# Patient Record
Sex: MALE | Race: WHITE | Employment: OTHER | URBAN - METROPOLITAN AREA
[De-identification: names, ages, dates, MRNs, and addresses within clinical notes are randomized per-mention and may not be internally consistent; named-entity substitution may affect disease eponyms.]

---

## 2024-06-12 LAB — PSA, EXTERNAL: 1.8

## 2024-07-11 ENCOUNTER — OFFICE VISIT (OUTPATIENT)
Age: 74
End: 2024-07-11
Payer: MEDICARE

## 2024-07-11 VITALS
BODY MASS INDEX: 36.05 KG/M2 | DIASTOLIC BLOOD PRESSURE: 72 MMHG | HEART RATE: 63 BPM | TEMPERATURE: 98.1 F | SYSTOLIC BLOOD PRESSURE: 122 MMHG | WEIGHT: 272 LBS | HEIGHT: 73 IN | OXYGEN SATURATION: 95 %

## 2024-07-11 DIAGNOSIS — E11.65 TYPE 2 DIABETES MELLITUS WITH HYPERGLYCEMIA, WITHOUT LONG-TERM CURRENT USE OF INSULIN (HCC): Primary | ICD-10-CM

## 2024-07-11 DIAGNOSIS — E78.2 MIXED HYPERLIPIDEMIA: ICD-10-CM

## 2024-07-11 DIAGNOSIS — E11.65 TYPE 2 DIABETES MELLITUS WITH HYPERGLYCEMIA, WITHOUT LONG-TERM CURRENT USE OF INSULIN (HCC): ICD-10-CM

## 2024-07-11 LAB
ANION GAP SERPL CALC-SCNC: 5 MMOL/L (ref 5–15)
BUN SERPL-MCNC: 12 MG/DL (ref 6–20)
BUN/CREAT SERPL: 11 (ref 12–20)
CALCIUM SERPL-MCNC: 9.6 MG/DL (ref 8.5–10.1)
CHLORIDE SERPL-SCNC: 111 MMOL/L (ref 97–108)
CO2 SERPL-SCNC: 26 MMOL/L (ref 21–32)
CREAT SERPL-MCNC: 1.07 MG/DL (ref 0.7–1.3)
GLUCOSE SERPL-MCNC: 120 MG/DL (ref 65–100)
POTASSIUM SERPL-SCNC: 4.6 MMOL/L (ref 3.5–5.1)
SODIUM SERPL-SCNC: 142 MMOL/L (ref 136–145)

## 2024-07-11 PROCEDURE — G8417 CALC BMI ABV UP PARAM F/U: HCPCS | Performed by: INTERNAL MEDICINE

## 2024-07-11 PROCEDURE — 99204 OFFICE O/P NEW MOD 45 MIN: CPT | Performed by: INTERNAL MEDICINE

## 2024-07-11 PROCEDURE — G8427 DOCREV CUR MEDS BY ELIG CLIN: HCPCS | Performed by: INTERNAL MEDICINE

## 2024-07-11 PROCEDURE — 3017F COLORECTAL CA SCREEN DOC REV: CPT | Performed by: INTERNAL MEDICINE

## 2024-07-11 PROCEDURE — 2022F DILAT RTA XM EVC RTNOPTHY: CPT | Performed by: INTERNAL MEDICINE

## 2024-07-11 PROCEDURE — 4004F PT TOBACCO SCREEN RCVD TLK: CPT | Performed by: INTERNAL MEDICINE

## 2024-07-11 PROCEDURE — 1123F ACP DISCUSS/DSCN MKR DOCD: CPT | Performed by: INTERNAL MEDICINE

## 2024-07-11 PROCEDURE — 3046F HEMOGLOBIN A1C LEVEL >9.0%: CPT | Performed by: INTERNAL MEDICINE

## 2024-07-11 RX ORDER — LANCETS 30 GAUGE
EACH MISCELLANEOUS
Qty: 360 EACH | Refills: 3 | Status: SHIPPED | OUTPATIENT
Start: 2024-07-11 | End: 2024-07-11 | Stop reason: SDUPTHER

## 2024-07-11 RX ORDER — DAPAGLIFLOZIN 10 MG/1
10 TABLET, FILM COATED ORAL EVERY MORNING
Qty: 90 TABLET | Refills: 3 | Status: SHIPPED | OUTPATIENT
Start: 2024-07-11

## 2024-07-11 RX ORDER — BLOOD-GLUCOSE SENSOR
EACH MISCELLANEOUS
Qty: 6 EACH | Refills: 3 | Status: SHIPPED | OUTPATIENT
Start: 2024-07-11

## 2024-07-11 RX ORDER — SEMAGLUTIDE 0.68 MG/ML
0.5 INJECTION, SOLUTION SUBCUTANEOUS WEEKLY
Qty: 6 ML | Refills: 3 | Status: SHIPPED | OUTPATIENT
Start: 2024-07-11

## 2024-07-11 RX ORDER — SEMAGLUTIDE 0.68 MG/ML
0.25 INJECTION, SOLUTION SUBCUTANEOUS WEEKLY
COMMUNITY
End: 2024-07-11 | Stop reason: SDUPTHER

## 2024-07-11 RX ORDER — KETOROLAC TROMETHAMINE 30 MG/ML
INJECTION, SOLUTION INTRAMUSCULAR; INTRAVENOUS
Qty: 1 EACH | Refills: 0 | Status: SHIPPED | OUTPATIENT
Start: 2024-07-11 | End: 2024-07-11

## 2024-07-11 RX ORDER — TAMSULOSIN HYDROCHLORIDE 0.4 MG/1
0.4 CAPSULE ORAL DAILY
COMMUNITY
Start: 2023-06-12

## 2024-07-11 RX ORDER — DAPAGLIFLOZIN 10 MG/1
10 TABLET, FILM COATED ORAL EVERY MORNING
COMMUNITY
End: 2024-07-11 | Stop reason: SDUPTHER

## 2024-07-11 RX ORDER — GLUCOSAMINE HCL/CHONDROITIN SU 500-400 MG
CAPSULE ORAL
Qty: 100 STRIP | Refills: 3 | Status: SHIPPED | OUTPATIENT
Start: 2024-07-11 | End: 2024-07-11 | Stop reason: SDUPTHER

## 2024-07-11 RX ORDER — GLIPIZIDE 5 MG/1
2.5 TABLET ORAL
Qty: 45 TABLET | Refills: 1 | Status: SHIPPED | OUTPATIENT
Start: 2024-07-11

## 2024-07-11 RX ORDER — GLUCOSAMINE HCL/CHONDROITIN SU 500-400 MG
CAPSULE ORAL
Qty: 100 STRIP | Refills: 3 | Status: SHIPPED | OUTPATIENT
Start: 2024-07-11

## 2024-07-11 RX ORDER — ROSUVASTATIN CALCIUM 5 MG/1
10 TABLET, COATED ORAL DAILY
COMMUNITY

## 2024-07-11 RX ORDER — LANCETS 30 GAUGE
EACH MISCELLANEOUS
Qty: 360 EACH | Refills: 3 | Status: SHIPPED | OUTPATIENT
Start: 2024-07-11

## 2024-07-11 NOTE — PATIENT INSTRUCTIONS
Continue Farxiga 10 mg daily   Increase Ozempic to 0.5 mg weekly  Start Glipizide 2.5 daily with breakfast     Diabetes general guidelines:   Target glucose  Fastin-130  2 hours after meals: less than 180    Check glucose levels as directed   Have a small snack if glucose is less than 80 at bedtime    Seek urgent care if glucose levels above 300s and not coming down, especially if associated with excessive thirst, urination, confusion or generally feeling unwell.     For Blood Glucose < 70 mg/dl treat with 4 ounces of juice or 4 glucose tablets (15 g). Recheck Blood Glucose in 15 minutes. Repeat until blood glucose is > 70.

## 2024-07-11 NOTE — TELEPHONE ENCOUNTER
Pharmacist from [facility] called stating that pt insurance will not cover meters and supplies because pt is not insulin depended. Order needs to be rewritten for once daily.

## 2024-07-11 NOTE — TELEPHONE ENCOUNTER
Patient has called back stating he now needs to discuss whether to take ozempic or farxiga as he cannot afford both?? Patient states he has a block on calls and may need to just leave a message. Patient was at pharmacy advised he cannot stay and wait he needs to be patient and wait for return call

## 2024-07-11 NOTE — TELEPHONE ENCOUNTER
Pt was called and he stated he was made aware that the Ozempic and Faxiga will cost him $500 each and he can only afford one medication at this time. Pt wants to know which medication is important. I told pt both medications are important. Pt wants to know which one of the medications does Dr. Dickinson recommend for him to take right now because he cannot afford both.

## 2024-07-11 NOTE — PROGRESS NOTES
Kevin Pedersen is a 73 y.o. male here for   Chief Complaint   Patient presents with    New Patient    Diabetes       1. Have you been to the ER, urgent care clinic since your last visit?  Hospitalized since your last visit? -N/A    2. Have you seen or consulted any other health care providers outside of the [facility] since your last visit?  Include any pap smears or colon screening.-N/A      
tablet Take 2 tablets by mouth daily      dapagliflozin (FARXIGA) 10 MG tablet Take 1 tablet by mouth every morning      tamsulosin (FLOMAX) 0.4 MG capsule Take 1 capsule by mouth daily      Lancets MISC Test twice daily--dispense whatever is brand is covered by insurance 360 each 3    blood glucose monitor supplies Test twice daily--dispense whatever is brand is covered by insurance 1 each 0    blood glucose monitor strips Test twice daily--dispense whatever is brand is covered by insurance 100 strip 3    Continuous Glucose  (FREESTYLE DAVID 3 READER) KISHOR Change every 14 days. Dx code E11.65 1 each 0    Semaglutide,0.25 or 0.5MG/DOS, (OZEMPIC, 0.25 OR 0.5 MG/DOSE,) 2 MG/3ML SOPN Inject 0.5 mg into the skin once a week 6 mL 3    glipiZIDE (GLUCOTROL) 5 MG tablet Take 0.5 tablets by mouth daily (with breakfast) 45 tablet 1     No current facility-administered medications for this visit.        Past Medical History:   Diagnosis Date    Diabetes mellitus (HCC)     Hyperlipidemia     Osteoarthritis         No past surgical history on file.     Social History     Tobacco Use    Smoking status: Former     Types: Cigarettes    Smokeless tobacco: Never   Substance Use Topics    Alcohol use: Not on file      Employer:  No address on file.     No family history on file.     PHYSICAL EXAM  Blood pressure 122/72, pulse 63, temperature 98.1 °F (36.7 °C), temperature source Temporal, height 1.854 m (6' 1\"), weight 123.4 kg (272 lb), SpO2 95 %.   GENERAL: Well-developed, well-nourished male in no acute distress.  HENT: normocephalic, atraumatic  EYES: EOMI. No lid lag, proptosis, icterus, conjunctival injection, periorbital edema.  THYROID: No thyromegaly, no nodules appreciated  LYMPH: No submandibular, cervical, or supraclavicular lymphadenopathy.  CARDIO: Regular rate, +trace LE edema, +varicose veins LE   RESP: Breathing comfortably. No use of accessory muscles.  GI: Soft, nontender, nondistended. No rebound/guarding.

## 2024-07-11 NOTE — TELEPHONE ENCOUNTER
Patient states he was told to continue farxiga but was only given a sample by past physician will need prescription sent to pharmacy as he went to pharmacy thinking a prescription would be there so unclear what was discussed for prescriptions at visit. Informed I would request to nurse

## 2024-07-12 LAB — C PEPTIDE SERPL-MCNC: 5.8 NG/ML (ref 1.1–4.4)

## 2024-07-12 NOTE — TELEPHONE ENCOUNTER
I called pt and he said he called his insurance and they stated that he only have to pay $500 deductible for medications. Pt stated he went ahead and pay the deductible and will be picking up both medications on Monday.

## 2024-08-29 ENCOUNTER — OFFICE VISIT (OUTPATIENT)
Age: 74
End: 2024-08-29
Payer: MEDICARE

## 2024-08-29 VITALS
WEIGHT: 265.6 LBS | HEIGHT: 73 IN | TEMPERATURE: 97.7 F | DIASTOLIC BLOOD PRESSURE: 79 MMHG | HEART RATE: 75 BPM | SYSTOLIC BLOOD PRESSURE: 126 MMHG | BODY MASS INDEX: 35.2 KG/M2 | OXYGEN SATURATION: 95 %

## 2024-08-29 DIAGNOSIS — E78.2 MIXED HYPERLIPIDEMIA: ICD-10-CM

## 2024-08-29 DIAGNOSIS — E11.65 TYPE 2 DIABETES MELLITUS WITH HYPERGLYCEMIA, WITHOUT LONG-TERM CURRENT USE OF INSULIN (HCC): Primary | ICD-10-CM

## 2024-08-29 PROCEDURE — 99214 OFFICE O/P EST MOD 30 MIN: CPT | Performed by: INTERNAL MEDICINE

## 2024-08-29 PROCEDURE — G8417 CALC BMI ABV UP PARAM F/U: HCPCS | Performed by: INTERNAL MEDICINE

## 2024-08-29 PROCEDURE — 1123F ACP DISCUSS/DSCN MKR DOCD: CPT | Performed by: INTERNAL MEDICINE

## 2024-08-29 PROCEDURE — 3017F COLORECTAL CA SCREEN DOC REV: CPT | Performed by: INTERNAL MEDICINE

## 2024-08-29 PROCEDURE — 4004F PT TOBACCO SCREEN RCVD TLK: CPT | Performed by: INTERNAL MEDICINE

## 2024-08-29 PROCEDURE — G8427 DOCREV CUR MEDS BY ELIG CLIN: HCPCS | Performed by: INTERNAL MEDICINE

## 2024-08-29 PROCEDURE — 2022F DILAT RTA XM EVC RTNOPTHY: CPT | Performed by: INTERNAL MEDICINE

## 2024-08-29 PROCEDURE — 3046F HEMOGLOBIN A1C LEVEL >9.0%: CPT | Performed by: INTERNAL MEDICINE

## 2024-08-29 NOTE — PROGRESS NOTES
Follow up - Diabetes     PCP: Paolo Krishna MD    Chief Complaint   Patient presents with    Diabetes     HPI:  Kevin Pedersen is a 73 y.o. male with  has a past medical history of Diabetes mellitus (HCC), Hyperlipidemia, and Osteoarthritis. Here for follow up of diabetes.     Diagnosed with pre-DM >20 years ago and recently underwent pcp eval for polyuria/polydipsia and discovered to have T2DM, uncontrolled.   with an A1C of 11.9%  Was then started on crestor and farxiga  Reports improvement in polyuria, back to normal     Complications:  No known history of CAD, CVA, PVD  No known history of DR, nephropathy, or neuropathy.    No personal or family history of MEN or MTC  No personal history of pancreatitis  No personal or family history of bladder cancer  No known history of thyroid or calcium disorder  No prior hospitalizations for diabetes  No known history of DKA or HHS  No prior foot sores or amputations  No history of severe hypoglycemia    Current DM medications:  Farxiga 10 mg daily   Ozempic 0.5 mg weekly  Glipizide 5 daily with breakfast     BG trends:  Checking BID     Occasional juice  Activity - golf 2-3x/week     Neck and eye [facility] - [facility].   Mild, and ongoing x weeks and intermittent     BRIEF ROS   Gen: no fevers/chills  CV: no chest pain  Resp: no shortness of breath, cough   GI: no nausea, emesis, abdominal pain  Neuro: no confusion     LABS/STUDIES:   No results found for: \"CDA0ASCT\"  Lab Results   Component Value Date/Time    CREATININE 1.07 07/11/2024 09:22 AM    LABGLOM 74 07/11/2024 09:22 AM     No results found for: \"CHOL\", \"TRIG\", \"HDL\"  No results found for: \"TSH\"    Lab Results   Component Value Date/Time    CPEPTIDE 5.8 07/11/2024 09:22 AM     Current Outpatient Medications   Medication Sig Dispense Refill    Acetaminophen (TYLENOL EXTRA STRENGTH PO) Take by mouth as needed      rosuvastatin (CRESTOR) 5 MG tablet Take 2 tablets by mouth daily    Expiration Date (Month Year): 04/2025

## 2024-08-29 NOTE — PROGRESS NOTES
Kevin Pedersen is a 73 y.o. male here for   Chief Complaint   Patient presents with    Diabetes       1. Have you been to the ER, urgent care clinic since your last visit?  Hospitalized since your last visit? -NO    2. Have you seen or consulted any other health care providers outside of the [facility] since your last visit?  Include any pap smears or colon screening.-Pt stated he saw the dentist

## 2024-11-07 ENCOUNTER — TELEPHONE (OUTPATIENT)
Age: 74
End: 2024-11-07

## 2024-11-07 NOTE — TELEPHONE ENCOUNTER
Patient called having problems with neuropathy in feet. Wanted to let you, stated he will check with pcp to inform as well. Thank you

## 2024-12-17 DIAGNOSIS — E11.65 TYPE 2 DIABETES MELLITUS WITH HYPERGLYCEMIA, WITHOUT LONG-TERM CURRENT USE OF INSULIN (HCC): ICD-10-CM

## 2024-12-17 RX ORDER — DAPAGLIFLOZIN 10 MG/1
10 TABLET, FILM COATED ORAL EVERY MORNING
Qty: 90 TABLET | Refills: 3 | OUTPATIENT
Start: 2024-12-17

## 2024-12-17 RX ORDER — SEMAGLUTIDE 0.68 MG/ML
0.5 INJECTION, SOLUTION SUBCUTANEOUS WEEKLY
Qty: 6 ML | Refills: 3 | OUTPATIENT
Start: 2024-12-17

## 2024-12-17 NOTE — TELEPHONE ENCOUNTER
Patient can not afford his medication,the farxiga and ozempic.  you all discussed another option, can you please call him to discuss. Thank you

## 2024-12-17 NOTE — TELEPHONE ENCOUNTER
Pt was called and he wants to update Dr. Dickinson of his A1C- 6.1%. He stated he has been checking his BG every other day and it has been running between 120-130. Pt stated he is currently paying $188 for the Ozempic and $113 for the Farxiga. He stated there might be some changes to his insurance and having a donut hole. Pt wants to know if Dr. Dickinson plan on changing his medications at his next visit and if not, can the medication be change to what is covered. I advised pt to call his insurance to find out what will be covered by his insurance. Pt stated he will call his insurance.

## 2024-12-20 NOTE — TELEPHONE ENCOUNTER
I called pt to follow up if he has spoken to his insurance. Pt stated he called multiple times and was placed on hold for a long time. He stated he will call again on Monday morning and will call the office after he speaks with them.

## 2024-12-31 ENCOUNTER — TELEPHONE (OUTPATIENT)
Age: 74
End: 2024-12-31

## 2024-12-31 NOTE — TELEPHONE ENCOUNTER
Patient called back with some information relating to other options in replacement of ozempic.  They will not cost him much or nothing. 1. Glimeiride 2. Nateglinide 3.Pioglitazone 4. Repaglineide  Please inform. Thank you

## 2024-12-31 NOTE — TELEPHONE ENCOUNTER
He can just stop the ozempic and do     - Farxiga 10 mg daily (he should already have this)  - Increase glipizide to 5 mg with breakfast and 2.5 mg (half tab) with dinner

## 2024-12-31 NOTE — TELEPHONE ENCOUNTER
Informed pt of Dr. Dickinson's note(He can just stop the ozempic and do. Farxiga 10 mg daily (he should already have this), Increase glipizide to 5 mg with breakfast and 2.5 mg (half tab) with dinner). Pt verbalized understanding with no further questions or concerns at this time.

## 2025-01-03 DIAGNOSIS — E11.65 TYPE 2 DIABETES MELLITUS WITH HYPERGLYCEMIA, WITHOUT LONG-TERM CURRENT USE OF INSULIN (HCC): ICD-10-CM

## 2025-01-03 RX ORDER — GLIPIZIDE 5 MG/1
TABLET ORAL
Qty: 135 TABLET | Refills: 1 | Status: SHIPPED | OUTPATIENT
Start: 2025-01-03

## 2025-02-24 ENCOUNTER — LAB (OUTPATIENT)
Age: 75
End: 2025-02-24

## 2025-02-24 DIAGNOSIS — E78.2 MIXED HYPERLIPIDEMIA: ICD-10-CM

## 2025-02-24 DIAGNOSIS — E11.65 TYPE 2 DIABETES MELLITUS WITH HYPERGLYCEMIA, WITHOUT LONG-TERM CURRENT USE OF INSULIN (HCC): ICD-10-CM

## 2025-02-25 LAB
ALBUMIN SERPL-MCNC: 3.9 G/DL (ref 3.5–5)
ALBUMIN/GLOB SERPL: 1.3 (ref 1.1–2.2)
ALP SERPL-CCNC: 109 U/L (ref 45–117)
ALT SERPL-CCNC: 24 U/L (ref 12–78)
ANION GAP SERPL CALC-SCNC: 5 MMOL/L (ref 2–12)
AST SERPL-CCNC: 23 U/L (ref 15–37)
BILIRUB SERPL-MCNC: 0.7 MG/DL (ref 0.2–1)
BUN SERPL-MCNC: 15 MG/DL (ref 6–20)
BUN/CREAT SERPL: 13 (ref 12–20)
CALCIUM SERPL-MCNC: 9.4 MG/DL (ref 8.5–10.1)
CHLORIDE SERPL-SCNC: 108 MMOL/L (ref 97–108)
CHOLEST SERPL-MCNC: 141 MG/DL
CO2 SERPL-SCNC: 27 MMOL/L (ref 21–32)
CREAT SERPL-MCNC: 1.13 MG/DL (ref 0.7–1.3)
CREAT UR-MCNC: 175 MG/DL
EST. AVERAGE GLUCOSE BLD GHB EST-MCNC: 137 MG/DL
GLOBULIN SER CALC-MCNC: 3 G/DL (ref 2–4)
GLUCOSE SERPL-MCNC: 154 MG/DL (ref 65–100)
HBA1C MFR BLD: 6.4 % (ref 4–5.6)
HDLC SERPL-MCNC: 39 MG/DL
HDLC SERPL: 3.6 (ref 0–5)
LDLC SERPL CALC-MCNC: 67.2 MG/DL (ref 0–100)
MICROALBUMIN UR-MCNC: 1.02 MG/DL
MICROALBUMIN/CREAT UR-RTO: 6 MG/G (ref 0–30)
POTASSIUM SERPL-SCNC: 4.6 MMOL/L (ref 3.5–5.1)
PROT SERPL-MCNC: 6.9 G/DL (ref 6.4–8.2)
SODIUM SERPL-SCNC: 140 MMOL/L (ref 136–145)
TRIGL SERPL-MCNC: 174 MG/DL
VLDLC SERPL CALC-MCNC: 34.8 MG/DL

## 2025-03-03 ENCOUNTER — OFFICE VISIT (OUTPATIENT)
Age: 75
End: 2025-03-03
Payer: MEDICARE

## 2025-03-03 VITALS
WEIGHT: 282.2 LBS | DIASTOLIC BLOOD PRESSURE: 86 MMHG | HEART RATE: 62 BPM | SYSTOLIC BLOOD PRESSURE: 138 MMHG | HEIGHT: 73 IN | TEMPERATURE: 97.6 F | BODY MASS INDEX: 37.4 KG/M2 | OXYGEN SATURATION: 95 %

## 2025-03-03 DIAGNOSIS — E78.2 MIXED HYPERLIPIDEMIA: ICD-10-CM

## 2025-03-03 DIAGNOSIS — E11.65 TYPE 2 DIABETES MELLITUS WITH HYPERGLYCEMIA, WITHOUT LONG-TERM CURRENT USE OF INSULIN (HCC): Primary | ICD-10-CM

## 2025-03-03 PROCEDURE — 99214 OFFICE O/P EST MOD 30 MIN: CPT | Performed by: INTERNAL MEDICINE

## 2025-03-03 RX ORDER — PIOGLITAZONE 15 MG/1
15 TABLET ORAL DAILY
Qty: 90 TABLET | Refills: 1 | Status: SHIPPED | OUTPATIENT
Start: 2025-03-03

## 2025-03-03 NOTE — PROGRESS NOTES
Kevin Pedersen is a 74 y.o. male here for   Chief Complaint   Patient presents with    Diabetes       1. Have you been to the ER, urgent care clinic since your last visit?  Hospitalized since your last visit? -no    2. Have you seen or consulted any other health care providers outside of the [facility] since your last visit?  Include any pap smears or colon screening.-PCP and Dentist      
rate, +trace LE edema, +varicose veins LE   RESP: Breathing comfortably. No use of accessory muscles.  GI: Soft, nontender, nondistended. No rebound/guarding. No palpable mass.  MSK: no joint swelling hands, no joint swelling or pain of the knee/ankle  NEUROLOGIC: No tremor. Speech coherent, no dysarthria.  PSYCHIATRIC: Pleasant, Normal affect, normal judgment.  SKIN: Normal temperature, no ecchymoses, no striae    Assessment/Plan:     1. Type 2 diabetes mellitus with hyperglycemia, without long-term current use of insulin  Did not tolerate metformin   A1c controlled at 6.4%   Continue Farxiga 10 mg daily   Stop glizipide and switch to actos 15 mg daily   As reporting tremors w/glipizide   -goal BS is   -to call if BS is <70 or >300  -bring log and meter to each visit  -I reviewed pertinent lab data, provider notes, imaging and reports in care-everywhere   -discussed diet modifications and to receive 150 min of exercise if possible per week of moderate intensity  -discussed treatment for hypoglycemia with glucose tabs and/or 4oz of juice and wait 15 min to recheck BS and to repeat until BS>80  -counseled patient on long term disease complications     Microvascular screening   Ophtho - exam UTD 2024, no DR per pt   Feet- checking daily  Renal - fxn stable, screen NL    2. Mixed hyperlipidemia  C/w crestor tx      1. Type 2 diabetes mellitus with hyperglycemia, without long-term current use of insulin (HCC)  -     pioglitazone (ACTOS) 15 MG tablet; Take 1 tablet by mouth daily, Disp-90 tablet, R-1Normal  -     Hemoglobin A1C; Future  2. Mixed hyperlipidemia    Discussed with patient: unless any ordered testing results are urgent, they will be reviewed at scheduled follow up visit.     Return in about 1 year (around 3/3/2026).    Thank you for allowing me to participate in the care of this patient.

## 2025-06-05 ENCOUNTER — TELEPHONE (OUTPATIENT)
Age: 75
End: 2025-06-05

## 2025-06-05 DIAGNOSIS — E11.65 TYPE 2 DIABETES MELLITUS WITH HYPERGLYCEMIA, WITHOUT LONG-TERM CURRENT USE OF INSULIN (HCC): Primary | ICD-10-CM

## 2025-06-05 NOTE — TELEPHONE ENCOUNTER
Patient is asking to go back on ozempic. He was originally taken off due to insurance coverage, but the coverage has since changed and should be more affordable to him. He would like a call to discuss further.

## 2025-06-06 NOTE — TELEPHONE ENCOUNTER
Attempted to call. Unsuccessful. Left msg for Kevin Pedersen to give us a call back at the office. A callback number was left.

## 2025-06-06 NOTE — TELEPHONE ENCOUNTER
Pt was called and he stated he is currently tking Farxiga 10mg daily and Actos 15mg daily. He said he would like to get back on the Ozempic.

## 2025-06-09 RX ORDER — SEMAGLUTIDE 0.68 MG/ML
INJECTION, SOLUTION SUBCUTANEOUS
Qty: 9 ML | Refills: 3 | Status: ACTIVE | OUTPATIENT
Start: 2025-06-09

## 2025-06-09 NOTE — TELEPHONE ENCOUNTER
Ok please inform:    - continue Farxiga 10 mg daily  - stop Actos  - Start Ozempic 0.25 mg weekly for 4 weeks, then increase to 0.5 mg weekly thereafter. Monitor for any stomach issues. The symptoms should be mild and last only a couple weeks. If symptoms are more significant than that, please let me know.

## 2025-06-09 NOTE — TELEPHONE ENCOUNTER
Informed pt of Dr. Dickinson's note. Pt verbalized understanding with no further questions or concerns at this time.